# Patient Record
Sex: FEMALE | Race: BLACK OR AFRICAN AMERICAN
[De-identification: names, ages, dates, MRNs, and addresses within clinical notes are randomized per-mention and may not be internally consistent; named-entity substitution may affect disease eponyms.]

---

## 2018-02-08 ENCOUNTER — HOSPITAL ENCOUNTER (EMERGENCY)
Dept: HOSPITAL 72 - EMR | Age: 33
Discharge: HOME | End: 2018-02-08
Payer: MEDICAID

## 2018-02-08 VITALS — BODY MASS INDEX: 31.65 KG/M2 | HEIGHT: 65 IN | WEIGHT: 190 LBS

## 2018-02-08 VITALS — DIASTOLIC BLOOD PRESSURE: 70 MMHG | SYSTOLIC BLOOD PRESSURE: 108 MMHG

## 2018-02-08 VITALS — DIASTOLIC BLOOD PRESSURE: 69 MMHG | SYSTOLIC BLOOD PRESSURE: 106 MMHG

## 2018-02-08 DIAGNOSIS — L03.011: Primary | ICD-10-CM

## 2018-02-08 DIAGNOSIS — Z88.2: ICD-10-CM

## 2018-02-08 PROCEDURE — 10060 I&D ABSCESS SIMPLE/SINGLE: CPT

## 2018-02-08 PROCEDURE — 99283 EMERGENCY DEPT VISIT LOW MDM: CPT

## 2018-02-08 NOTE — EMERGENCY ROOM REPORT
History of Present Illness


General


Chief Complaint:  Upper Extremity Injury


Source:  Patient





Present Illness


HPI


32-year-old female presents to the emergency department complaining of 9/10 in 

severity pain, swelling and tenderness to the right middle finger since 

yesterday.  Patient reports pain initially started on the medial aspect of the 

cuticle and then progressed to the entire cuticle line of the fingernail.  She 

reports having artificial nails placed approximately one week ago.  She denies 

nail-biting. Denies fever pad tenderness.  Pain is exacerbated upon palpation.  

Reports that she is able to bend her finger at the distal portion.  Denies CP, 

Palpitations, LOC, AMS, dizziness, Changes in Vision, Sensation, paresthesias, 

or a sudden severe headache.


Allergies:  


Uncoded Allergies:  


     SULFA (Allergy, Severe, Hives, 2/8/18)





Patient History


Past Medical History:  see triage record


Past Surgical History:  none


Pertinent Family History:  none


Last Menstrual Period:  last month


Pregnant Now:  No


Reviewed Nursing Documentation:  PMH: Agreed, PSxH: Agreed





Nursing Documentation-PMH


Hx Gastrointestinal Problems:  Yes - ulcerative colitis





Review of Systems


All Other Systems:  negative except mentioned in HPI





Physical Exam





Vital Signs








  Date Time  Temp Pulse Resp B/P (MAP) Pulse Ox O2 Delivery O2 Flow Rate FiO2


 


2/8/18 11:36 98.6 97 18 106/69 98 Room Air  








Sp02 EP Interpretation:  reviewed, normal


General Appearance:  no apparent distress, alert, GCS 15, non-toxic


Head:  normocephalic, atraumatic


ENT:  hearing grossly normal, normal voice


Neck:  full range of motion


Respiratory:  lungs clear, normal breath sounds, speaking full sentences


Cardiovascular #1:  regular rate, rhythm, normal capillary refill


Musculoskeletal:  back normal, gait/station normal, normal range of motion, non-

tender


Neurologic:  alert, oriented x3, responsive, motor strength/tone normal, 

sensory intact, speech normal, grossly normal


Psychiatric:  judgement/insight normal


Skin:  no rash, warm/dry, well hydrated, other - erythema, swelling and 

tenderness to the medial aspect of the RMF nail fold, no finger pad tenderness, 

no blisters or vesicles.





Procedures


Additional Procedure


Procedure Narrative


PARONYCHIA DRAINAGE





- verbal consent was received .


- lesion was cleaned with betadine prep. 


- Small incision using a sterile 25g needle to drain the paronychia. pt. 

tolerated well without complication. 


- sterile band-aid was then applied afterward. 


- will d/c pt. with PO abx.





Medical Decision Making


PA Attestation


Dr. Amato is my supervising Physician whom patient management has been 

discussed with.


Diagnostic Impression:  


 Primary Impression:  


 Paronychia of finger of right hand


ER Course


32-year-old female presents to the emergency department complaining of 9/10 in 

severity pain, swelling and tenderness to the right middle finger since 

yesterday.  Patient reports pain initially started on the medial aspect of the 

cuticle and then progressed to the entire cuticle line of the fingernail.  She 

reports having artificial nails placed approximately one week ago.  She denies 

nail-biting. Denies fever pad tenderness.  Pain is exacerbated upon palpation.  

Reports that she is able to bend her finger at the distal portion.  Pt. is 

right hand dominant.  Denies CP, Palpitations, LOC, AMS, dizziness, Changes in 

Vision, Sensation, paresthesias, or a sudden severe headache.





Ddx considered but are not limited to cellulitis, paronychia, eponychia, 

ingrown toe nail, fracture, d/L, gout








Vital signs: are WNL, pt. is afebrile


H&PE are most consistent with Right middle finger paronychia


ORDERS: none required at this time, the diagnosis is clinical





ED INTERVENTIONS: 


- verbal consent was received .


- lesion was cleaned with betadine prep. 


- Small incision using a sterile 25g needle to drain the paronychia. pt. 

tolerated well without complication. 


- sterile band-aid was then applied afterward. 


- will d/c pt. with PO abx.








DISCHARGE: At this time pt. is stable for d/c to home. Will provide printed 

patient care instructions, and any necessary prescriptions. Care plan and 

follow up instructions have been discussed with the patient prior to discharge.





Last Vital Signs








  Date Time  Temp Pulse Resp B/P (MAP) Pulse Ox O2 Delivery O2 Flow Rate FiO2


 


2/8/18 11:36 98.6 97 18 106/69 98 Room Air  








Disposition:  HOME, SELF-CARE


Condition:  Stable


Patient Instructions:  Paronychia





Additional Instructions:  


Take medications as directed. 





 ** Follow up with a Primary Care Provider in 3-5 days, even if your symptoms 

have resolved. ** 


--Please review list of primary care clinics, if you do not already have a 

primary care provider





Return sooner to ED if new symptoms occur, or current symptoms become worse. 








- Please note that this Emergency Department Report was dictated using [employer] technology software, occasionally this can lead to 

erroneous entry secondary to interpretation by the dictation equipment.











Leatha Gutierrez Feb 8, 2018 12:38

## 2018-02-11 ENCOUNTER — HOSPITAL ENCOUNTER (EMERGENCY)
Dept: HOSPITAL 72 - EMR | Age: 33
Discharge: HOME | End: 2018-02-11
Payer: MEDICAID

## 2018-02-11 VITALS — HEIGHT: 65 IN | BODY MASS INDEX: 31.65 KG/M2 | WEIGHT: 190 LBS

## 2018-02-11 VITALS — SYSTOLIC BLOOD PRESSURE: 107 MMHG | DIASTOLIC BLOOD PRESSURE: 70 MMHG

## 2018-02-11 DIAGNOSIS — L03.011: Primary | ICD-10-CM

## 2018-02-11 DIAGNOSIS — Z88.2: ICD-10-CM

## 2018-02-11 PROCEDURE — 99284 EMERGENCY DEPT VISIT MOD MDM: CPT

## 2018-02-11 PROCEDURE — 10060 I&D ABSCESS SIMPLE/SINGLE: CPT

## 2018-02-11 NOTE — EMERGENCY ROOM REPORT
History of Present Illness


General


Chief Complaint:  Pain


Source:  Patient





Present Illness


HPI


The patient is a 32-year-old female presenting for continued pain of her right 

middle finger.  She was seen in this emergency department 3 days prior for the 

same complaint and diagnosed with paronychia.  Incision was done at that time 

and the patient was given prescription for antibiotics which the patient states 

that she filled but has not began taking yet.  She states that pain and 

swelling have now worsened.  Pain is a 9/10 dull ache and does not radiate.  

Worse with touch.  She denies any injury to the area and denies biting her 

nails.  She denies other symptoms including fever, chills, numbness, stiffness


Allergies:  


Uncoded Allergies:  


     SULFA (Allergy, Severe, Hives, 2/8/18)





Patient History


Past Medical History:  see triage record


Pertinent Family History:  none


Reviewed Nursing Documentation:  PMH: Agreed, PSxH: Agreed





Nursing Documentation-PMH


Hx Gastrointestinal Problems:  Yes - ulcerative colitis





Review of Systems


All Other Systems:  negative except mentioned in HPI





Physical Exam





Vital Signs








  Date Time  Temp Pulse Resp B/P (MAP) Pulse Ox O2 Delivery O2 Flow Rate FiO2


 


2/11/18 12:29 98.1 101 20 107/70 99 Room Air  








Sp02 EP Interpretation:  reviewed, normal


General Appearance:  no apparent distress, alert, GCS 15, non-toxic


Head:  normocephalic, atraumatic


Eyes:  bilateral eye normal inspection, bilateral eye PERRL


ENT:  hearing grossly normal, normal pharynx, no angioedema, normal voice


Neck:  full range of motion, supple/symm/no masses


Musculoskeletal:  back normal, gait/station normal, normal range of motion, 

tender - R distal 3rd digit


Neurologic:  alert, oriented x3, responsive, motor strength/tone normal, 

sensory intact, speech normal


Psychiatric:  judgement/insight normal, memory normal, mood/affect normal, no 

suicidal/homicidal ideation


Skin:  other - fluctuance of R 3rd nail fold


Lymphatic:  no adenopathy





Procedures


Incision and Drainage


Incision and Drainage :  


   Consent:  Verbal


   Site:  R 3rd digit


   Blade Size:  11


   I & D Procedure:  betadine prep, sterile drapes applied


   Wound Location:  upper extremity


   Wound's Depth, Shape:  superficial, linear


   Wound Length (cm):  1


   Wound Explored:  contaminated


   Irrigated w/ Saline (ccs):  50


   Anesthesia:  1% Lidocaine


   Volume Anesthetic (ccs):  5


   Splint Applied?:  No


   Sling Applied?:  No


   Patient Tolerated:  Well


   Complications:  None





Medical Decision Making


PA Attestation


Dr. Amato is my supervising physician. Patient management was discussed with 

my supervising physician


Diagnostic Impression:  


 Primary Impression:  


 Paronychia


ER Course


The patient is a 32-year-old female presenting for continued pain of her right 

middle finger.





Differential diagnoses considered but not limited to: paronychia, felon,  

cellulitis, insect bite





PE: afebrile. NAD


TTP and swelling to the R 3rd nail fold. Fluctuant. No fluctuance over pad of 

finger. Full AROM intact. SILT





Betadine prep was used to clean the skin and surrounding area.


One percent lidocaine without epinephrine was used for digital block.


A #11 blade was used to make an incision at the base of the nail. 


Once the incision was made, purulent material was expressed with blood. The 

wound was then cleaned and sterile dressing applied.





The patient was told she needs to complete the course of antibiotics which she 

was prescribed. She will FU with PMD. 


Return precautions given including if she notices fever, chills, numbness, 

stiffness of finger, or continued swelling





Last Vital Signs








  Date Time  Temp Pulse Resp B/P (MAP) Pulse Ox O2 Delivery O2 Flow Rate FiO2


 


2/11/18 13:25 98.1  20 107/70 99 Room Air  


 


2/11/18 12:29  101      








Status:  improved


Disposition:  HOME, SELF-CARE


Condition:  Improved


Scripts


Tramadol Hcl* (ULTRAM*) 50 Mg Tablet


50 MG ORAL Q6H Y for For Pain, #5 TAB 0 Refills


   Prov: HARJIT WILLAMS         2/11/18


Referrals:  


[facility] (PCP)


Patient Instructions:  Paronychia





Additional Instructions:  


I discussed my findings with the patient. All questions and concerns have been 

answered. Treatment and medication compliance have been addressed. I advised 

the patient that they need to follow up with primary doctor within 3 days for 

wound check. Return to ED if symptoms worsen, new symptoms arise such as fever, 

if you are unable to bend your finger, or if needed for any reason. Patient 

verbalized understanding of discharge instructions.





*Make sure to take your antibiotics as prescribed from your previous visit*











HARJIT WILLAMS Feb 11, 2018 13:44

## 2018-04-06 ENCOUNTER — HOSPITAL ENCOUNTER (EMERGENCY)
Dept: HOSPITAL 72 - EMR | Age: 33
Discharge: HOME | End: 2018-04-06
Payer: MEDICAID

## 2018-04-06 VITALS — SYSTOLIC BLOOD PRESSURE: 113 MMHG | DIASTOLIC BLOOD PRESSURE: 61 MMHG

## 2018-04-06 VITALS — HEIGHT: 64 IN | WEIGHT: 198 LBS | BODY MASS INDEX: 33.8 KG/M2

## 2018-04-06 DIAGNOSIS — J02.9: ICD-10-CM

## 2018-04-06 DIAGNOSIS — Z88.2: ICD-10-CM

## 2018-04-06 DIAGNOSIS — J06.9: Primary | ICD-10-CM

## 2018-04-06 PROCEDURE — 71045 X-RAY EXAM CHEST 1 VIEW: CPT

## 2018-04-06 PROCEDURE — 99284 EMERGENCY DEPT VISIT MOD MDM: CPT

## 2018-04-06 PROCEDURE — 81025 URINE PREGNANCY TEST: CPT

## 2018-04-06 NOTE — DIAGNOSTIC IMAGING REPORT
Indication: Cough

 

Technique: One view of the chest

 

Comparison: none

 

Findings: There is some scarring or atelectasis in the right infrahilar region. The

lungs and pleural spaces are otherwise clear. The heart size is normal

 

Impression: Right infrahilar scarring or atelectasis. No acute process otherwise

## 2018-04-06 NOTE — EMERGENCY ROOM REPORT
History of Present Illness


General


Chief Complaint:  Upper Respiratory Illness


Source:  Patient





Present Illness


HPI


32-year-old female p/w cough and throat pain for 2 days. Pt states cough is 

productive, with clear non bloody sputum. Denies fever chills sob.  Complaints 

of chest pain only when she coughs.  Denies runny nose or myalgias. No sick 

contacts or recent travel. Patient does not smoke.  Still able to drink and eat 

normally


Allergies:  


Uncoded Allergies:  


     SULFA (Allergy, Severe, Hives, 18)





Patient History


Past Medical History:  see triage record


Past Surgical History:  none


Pertinent Family History:  none


Last Menstrual Period:  18


Pregnant Now:  No


:  2


Para:  2


Reviewed Nursing Documentation:  PMH: Agreed; PSxH: Agreed





Nursing Documentation-PMH


Past Medical History:  No History, Except For


Hx Gastrointestinal Problems:  Yes - ulcerative colitis





Review of Systems


All Other Systems:  negative except mentioned in HPI





Physical Exam





Vital Signs








  Date Time  Temp Pulse Resp B/P (MAP) Pulse Ox O2 Delivery O2 Flow Rate FiO2


 


18 00:48 98.6 93 16 113/61 97 Room Air  





 98.6       








Sp02 EP Interpretation:  reviewed, normal


General Appearance:  normal inspection, well appearing, no apparent distress, 

alert, GCS 15, non-toxic


Head:  normocephalic, atraumatic


Eyes:  bilateral eye normal inspection, bilateral eye PERRL, bilateral eye EOMI


ENT:  pharyngeal erythema, other - no exudates


Neck:  normal inspection, full range of motion, supple


Respiratory:  normal inspection, lungs clear, normal breath sounds, no 

respiratory distress, no retraction, no wheezing, speaking full sentences, 

chest symmetrical


Cardiovascular #1:  normal inspection, regular rate, rhythm, no edema, normal 

capillary refill


Cardiovascular #2:  2+ radial (R), 2+ radial (L)


Gastrointestinal:  normal inspection, non tender, soft, non-distended, no 

guarding


Musculoskeletal:  normal inspection, back normal, normal range of motion, non-

tender


Neurologic:  normal inspection, alert, oriented x3, responsive, motor strength/

tone normal, sensory intact, normal gait, speech normal


Psychiatric:  normal inspection, judgement/insight normal, memory normal


Skin:  normal inspection, normal color, no rash, warm/dry, well hydrated, 

normal turgor





Medical Decision Making


Diagnostic Impression:  


 Primary Impression:  


 Upper respiratory infection


 Additional Impression:  


 Pharyngitis


ER Course


32-year-old female p/w cough and throat pain for 2 days. 





DDX: 


Viral URI vs. pneumonia versus pharyngitis





Plan: 


Chest x-ray


Motrin





ER course: 


Patient remains nontoxic, not in resp distress.


CXR obtained - no acute infiltrate





Disposition:


Patient is to be discharged home with a prescription of Tessalon Perles and 

Motrin


Strict precautions discussed with patient on when to return to the emergency 

room including hemoptysis, high fevers, chills, SOB, chest pain which may 

indicate severe illness.


Patient is to follow up with their primary care doctor within 5 days. Patient 

agrees with plan.


Please note that this Emergency Department Report was dictated using [employer] technology software, occasionally this can lead to 

erroneous entry secondary to interpretation by the dictation equipment





Chest X-ray


CXR: Ordered: Yes


1 view


Indication: Cough


EP interpretation: Yes


Interpretation: No consolidation, no effusion, no PTX, no acute cardiopulmonary 

disease


Impression: No acute disease


Electronically signed by Mihir Mendez MD





Last Vital Signs








  Date Time  Temp Pulse Resp B/P (MAP) Pulse Ox O2 Delivery O2 Flow Rate FiO2


 


18 00:48 98.6 93 16 113/61 97 Room Air  





 98.6       








Disposition:  HOME, SELF-CARE


Condition:  Improved


Scripts


Benzonatate* (TESSALON PERLE*) 100 Mg Capsule


100 MG ORAL THREE TIMES A DAY, #21 PERLE


   Prov: Mihir Mendez M.D.         18 


Ibuprofen* (MOTRIN*) 600 Mg Tablet


600 MG ORAL Q8H PRN for For Pain, #30 TAB 0 Refills


   Prov: Mihir Mendez M.D.         18











Mihir Mendez M.D. 2018 01:07

## 2018-04-17 ENCOUNTER — HOSPITAL ENCOUNTER (INPATIENT)
Dept: HOSPITAL 87 - ER | Age: 33
LOS: 4 days | Discharge: HOME | DRG: 245 | End: 2018-04-21
Attending: INTERNAL MEDICINE | Admitting: INTERNAL MEDICINE
Payer: MEDICAID

## 2018-04-17 VITALS — HEIGHT: 65 IN | WEIGHT: 190 LBS | BODY MASS INDEX: 31.65 KG/M2

## 2018-04-17 DIAGNOSIS — I10: ICD-10-CM

## 2018-04-17 DIAGNOSIS — K51.90: Primary | ICD-10-CM

## 2018-04-17 DIAGNOSIS — D50.9: ICD-10-CM

## 2018-04-17 DIAGNOSIS — Z82.49: ICD-10-CM

## 2018-04-17 DIAGNOSIS — E86.0: ICD-10-CM

## 2018-04-17 LAB
APPEARANCE UR: CLEAR
BASOPHILS NFR BLD AUTO: 0.6 % (ref 0–2)
CHLORIDE SERPL-SCNC: 106 MEQ/L (ref 98–107)
COLOR UR: YELLOW
EOSINOPHIL NFR BLD AUTO: 0.9 % (ref 0–5)
ERYTHROCYTE [DISTWIDTH] IN BLOOD BY AUTOMATED COUNT: 24 % (ref 11.6–14.6)
HCT VFR BLD AUTO: 28.8 % (ref 36–48)
HGB BLD-MCNC: 8.5 G/DL (ref 12–16)
HGB UR QL STRIP: (no result)
INR PPP: 1
KETONES UR STRIP-MCNC: NEGATIVE MG/DL
LEUKOCYTE ESTERASE UR QL STRIP: NEGATIVE
LYMPHOCYTES NFR BLD AUTO: 25.8 % (ref 20–50)
MCH RBC QN AUTO: 17.4 PG (ref 28–32)
MCV RBC AUTO: 59 FL (ref 81–99)
MONOCYTES NFR BLD AUTO: 6 % (ref 2–8)
NEUTROPHILS NFR BLD AUTO: 66.7 % (ref 40–76)
NITRITE UR QL STRIP: NEGATIVE
PH UR STRIP: 5 [PH] (ref 4.5–8)
PLATELET # BLD AUTO: 519 X1000/UL (ref 130–400)
PLATELET # BLD EST: (no result) 10*3/UL
PMV BLD AUTO: 8.5 FL (ref 7.4–10.4)
PROT UR QL STRIP: (no result)
PROTHROMBIN TIME: 10 SEC (ref 9.4–11.6)
RBC # BLD AUTO: 4.88 MILL/UL (ref 4.2–5.4)
SP GR UR STRIP: 1.02 (ref 1–1.03)
UROBILINOGEN UR STRIP-MCNC: 0.2 E.U./DL (ref 0.2–1)

## 2018-04-17 PROCEDURE — 81003 URINALYSIS AUTO W/O SCOPE: CPT

## 2018-04-17 PROCEDURE — 85014 HEMATOCRIT: CPT

## 2018-04-17 PROCEDURE — 81025 URINE PREGNANCY TEST: CPT

## 2018-04-17 PROCEDURE — 87040 BLOOD CULTURE FOR BACTERIA: CPT

## 2018-04-17 PROCEDURE — 96361 HYDRATE IV INFUSION ADD-ON: CPT

## 2018-04-17 PROCEDURE — 80061 LIPID PANEL: CPT

## 2018-04-17 PROCEDURE — 85610 PROTHROMBIN TIME: CPT

## 2018-04-17 PROCEDURE — 85025 COMPLETE CBC W/AUTO DIFF WBC: CPT

## 2018-04-17 PROCEDURE — 87086 URINE CULTURE/COLONY COUNT: CPT

## 2018-04-17 PROCEDURE — 85018 HEMOGLOBIN: CPT

## 2018-04-17 PROCEDURE — 96365 THER/PROPH/DIAG IV INF INIT: CPT

## 2018-04-17 PROCEDURE — 80048 BASIC METABOLIC PNL TOTAL CA: CPT

## 2018-04-17 PROCEDURE — 36415 COLL VENOUS BLD VENIPUNCTURE: CPT

## 2018-04-17 PROCEDURE — 99285 EMERGENCY DEPT VISIT HI MDM: CPT

## 2018-04-17 PROCEDURE — 96366 THER/PROPH/DIAG IV INF ADDON: CPT

## 2018-04-17 PROCEDURE — 96375 TX/PRO/DX INJ NEW DRUG ADDON: CPT

## 2018-04-17 PROCEDURE — 96367 TX/PROPH/DG ADDL SEQ IV INF: CPT

## 2018-04-17 PROCEDURE — 74176 CT ABD & PELVIS W/O CONTRAST: CPT

## 2018-04-17 PROCEDURE — 83690 ASSAY OF LIPASE: CPT

## 2018-04-17 PROCEDURE — 80053 COMPREHEN METABOLIC PANEL: CPT

## 2018-04-18 VITALS — DIASTOLIC BLOOD PRESSURE: 62 MMHG | SYSTOLIC BLOOD PRESSURE: 109 MMHG

## 2018-04-18 VITALS — SYSTOLIC BLOOD PRESSURE: 112 MMHG | DIASTOLIC BLOOD PRESSURE: 66 MMHG

## 2018-04-18 VITALS — DIASTOLIC BLOOD PRESSURE: 56 MMHG | SYSTOLIC BLOOD PRESSURE: 109 MMHG

## 2018-04-18 VITALS — SYSTOLIC BLOOD PRESSURE: 101 MMHG | DIASTOLIC BLOOD PRESSURE: 48 MMHG

## 2018-04-18 LAB — CHLORIDE SERPL-SCNC: 108 MEQ/L (ref 98–107)

## 2018-04-18 RX ADMIN — DEXTROSE MONOHYDRATE SCH MLS/HR: 50 INJECTION, SOLUTION INTRAVENOUS at 01:30

## 2018-04-18 RX ADMIN — METHYLPREDNISOLONE SODIUM SUCCINATE SCH MG: 125 INJECTION, POWDER, FOR SOLUTION INTRAMUSCULAR; INTRAVENOUS at 10:11

## 2018-04-18 RX ADMIN — METHYLPREDNISOLONE SODIUM SUCCINATE SCH MG: 125 INJECTION, POWDER, FOR SOLUTION INTRAMUSCULAR; INTRAVENOUS at 16:24

## 2018-04-18 RX ADMIN — ENOXAPARIN SODIUM SCH MG: 100 INJECTION SUBCUTANEOUS at 10:11

## 2018-04-18 RX ADMIN — METRONIDAZOLE SCH MLS/HR: 500 INJECTION, SOLUTION INTRAVENOUS at 10:11

## 2018-04-18 RX ADMIN — DEXTROSE MONOHYDRATE SCH MLS/HR: 50 INJECTION, SOLUTION INTRAVENOUS at 16:23

## 2018-04-18 RX ADMIN — MESALAMINE SCH MG: 400 CAPSULE, DELAYED RELEASE ORAL at 17:13

## 2018-04-18 RX ADMIN — METRONIDAZOLE SCH MLS/HR: 500 INJECTION, SOLUTION INTRAVENOUS at 17:14

## 2018-04-18 RX ADMIN — METHYLPREDNISOLONE SODIUM SUCCINATE SCH MG: 125 INJECTION, POWDER, FOR SOLUTION INTRAMUSCULAR; INTRAVENOUS at 21:03

## 2018-04-18 RX ADMIN — HYDROCODONE BITARTRATE AND ACETAMINOPHEN PRN TAB: 10; 325 TABLET ORAL at 17:14

## 2018-04-18 RX ADMIN — METHYLPREDNISOLONE SODIUM SUCCINATE SCH MG: 125 INJECTION, POWDER, FOR SOLUTION INTRAMUSCULAR; INTRAVENOUS at 04:50

## 2018-04-19 VITALS — SYSTOLIC BLOOD PRESSURE: 112 MMHG | DIASTOLIC BLOOD PRESSURE: 53 MMHG

## 2018-04-19 VITALS — SYSTOLIC BLOOD PRESSURE: 100 MMHG | DIASTOLIC BLOOD PRESSURE: 51 MMHG

## 2018-04-19 VITALS — SYSTOLIC BLOOD PRESSURE: 112 MMHG | DIASTOLIC BLOOD PRESSURE: 52 MMHG

## 2018-04-19 VITALS — SYSTOLIC BLOOD PRESSURE: 123 MMHG | DIASTOLIC BLOOD PRESSURE: 63 MMHG

## 2018-04-19 VITALS — DIASTOLIC BLOOD PRESSURE: 60 MMHG | SYSTOLIC BLOOD PRESSURE: 102 MMHG

## 2018-04-19 LAB
CHLORIDE SERPL-SCNC: 110 MEQ/L (ref 98–107)
ERYTHROCYTE [DISTWIDTH] IN BLOOD BY AUTOMATED COUNT: 24 % (ref 11.6–14.6)
HCT VFR BLD AUTO: 23.8 % (ref 36–48)
HCT VFR BLD AUTO: 25.1 % (ref 36–48)
HDLC SERPL-MCNC: 56 MG/DL (ref 40–59)
HGB BLD-MCNC: 6.9 G/DL (ref 12–16)
HGB BLD-MCNC: 7.3 G/DL (ref 12–16)
LDLC SERPL DIRECT ASSAY-MCNC: 90 MG/DL (ref 5–100)
LYMPHOCYTES NFR BLD MANUAL: 6 % (ref 20–60)
MCH RBC QN AUTO: 17.4 PG (ref 28–32)
MCV RBC AUTO: 60 FL (ref 81–99)
MONOCYTES NFR BLD MANUAL: 2 % (ref 2–8)
NEUTS BAND NFR BLD MANUAL: 1 % (ref 1–6)
NEUTS SEG NFR BLD MANUAL: 91 % (ref 45–75)
PLATELET # BLD AUTO: 455 X1000/UL (ref 130–400)
PLATELET # BLD EST: (no result) 10*3/UL
PMV BLD AUTO: 8.7 FL (ref 7.4–10.4)
RBC # BLD AUTO: 3.96 MILL/UL (ref 4.2–5.4)

## 2018-04-19 RX ADMIN — MESALAMINE SCH MG: 400 CAPSULE, DELAYED RELEASE ORAL at 12:22

## 2018-04-19 RX ADMIN — DIPHENHYDRAMINE HYDROCHLORIDE PRN MG: 50 INJECTION, SOLUTION INTRAMUSCULAR; INTRAVENOUS at 22:14

## 2018-04-19 RX ADMIN — MESALAMINE SCH MG: 400 CAPSULE, DELAYED RELEASE ORAL at 08:22

## 2018-04-19 RX ADMIN — ENOXAPARIN SODIUM SCH MG: 100 INJECTION SUBCUTANEOUS at 08:22

## 2018-04-19 RX ADMIN — METHYLPREDNISOLONE SODIUM SUCCINATE SCH MG: 125 INJECTION, POWDER, FOR SOLUTION INTRAMUSCULAR; INTRAVENOUS at 22:01

## 2018-04-19 RX ADMIN — HYDROCODONE BITARTRATE AND ACETAMINOPHEN PRN TAB: 10; 325 TABLET ORAL at 15:23

## 2018-04-19 RX ADMIN — LEVOFLOXACIN SCH MLS/HR: 5 INJECTION, SOLUTION INTRAVENOUS at 05:51

## 2018-04-19 RX ADMIN — METRONIDAZOLE SCH MLS/HR: 500 INJECTION, SOLUTION INTRAVENOUS at 09:16

## 2018-04-19 RX ADMIN — METRONIDAZOLE SCH MLS/HR: 500 INJECTION, SOLUTION INTRAVENOUS at 01:33

## 2018-04-19 RX ADMIN — DEXTROSE MONOHYDRATE SCH MLS/HR: 50 INJECTION, SOLUTION INTRAVENOUS at 05:54

## 2018-04-19 RX ADMIN — METRONIDAZOLE SCH MLS/HR: 500 INJECTION, SOLUTION INTRAVENOUS at 20:00

## 2018-04-19 RX ADMIN — MESALAMINE SCH MG: 400 CAPSULE, DELAYED RELEASE ORAL at 17:01

## 2018-04-19 RX ADMIN — METHYLPREDNISOLONE SODIUM SUCCINATE SCH MG: 125 INJECTION, POWDER, FOR SOLUTION INTRAMUSCULAR; INTRAVENOUS at 15:23

## 2018-04-19 RX ADMIN — DEXTROSE MONOHYDRATE SCH MLS/HR: 50 INJECTION, SOLUTION INTRAVENOUS at 17:03

## 2018-04-19 RX ADMIN — METHYLPREDNISOLONE SODIUM SUCCINATE SCH MG: 125 INJECTION, POWDER, FOR SOLUTION INTRAMUSCULAR; INTRAVENOUS at 09:15

## 2018-04-19 RX ADMIN — METHYLPREDNISOLONE SODIUM SUCCINATE SCH MG: 125 INJECTION, POWDER, FOR SOLUTION INTRAMUSCULAR; INTRAVENOUS at 05:50

## 2018-04-20 LAB
CHLORIDE SERPL-SCNC: 108 MEQ/L (ref 98–107)
ERYTHROCYTE [DISTWIDTH] IN BLOOD BY AUTOMATED COUNT: 24.2 % (ref 11.6–14.6)
HCT VFR BLD AUTO: 25.3 % (ref 36–48)
HGB BLD-MCNC: 7.1 G/DL (ref 12–16)
MCH RBC QN AUTO: 17.1 PG (ref 28–32)
MCV RBC AUTO: 60.6 FL (ref 81–99)
PLATELET # BLD AUTO: 488 X1000/UL (ref 130–400)
PMV BLD AUTO: 9 FL (ref 7.4–10.4)
RBC # BLD AUTO: 4.18 MILL/UL (ref 4.2–5.4)

## 2018-04-20 RX ADMIN — DIPHENHYDRAMINE HYDROCHLORIDE PRN MG: 50 INJECTION, SOLUTION INTRAMUSCULAR; INTRAVENOUS at 04:16

## 2018-04-20 RX ADMIN — METRONIDAZOLE SCH MLS/HR: 500 INJECTION, SOLUTION INTRAVENOUS at 11:49

## 2018-04-20 RX ADMIN — METHYLPREDNISOLONE SODIUM SUCCINATE SCH MG: 125 INJECTION, POWDER, FOR SOLUTION INTRAMUSCULAR; INTRAVENOUS at 09:28

## 2018-04-20 RX ADMIN — MESALAMINE SCH MG: 400 CAPSULE, DELAYED RELEASE ORAL at 09:28

## 2018-04-20 RX ADMIN — METRONIDAZOLE SCH MLS/HR: 500 INJECTION, SOLUTION INTRAVENOUS at 04:02

## 2018-04-20 RX ADMIN — MESALAMINE SCH MG: 400 CAPSULE, DELAYED RELEASE ORAL at 12:48

## 2018-04-20 RX ADMIN — LEVOFLOXACIN SCH MLS/HR: 5 INJECTION, SOLUTION INTRAVENOUS at 04:22

## 2018-04-20 RX ADMIN — METRONIDAZOLE SCH MLS/HR: 500 INJECTION, SOLUTION INTRAVENOUS at 20:03

## 2018-04-20 RX ADMIN — DEXTROSE MONOHYDRATE SCH MLS/HR: 50 INJECTION, SOLUTION INTRAVENOUS at 21:39

## 2018-04-20 RX ADMIN — DIPHENHYDRAMINE HYDROCHLORIDE PRN MG: 50 INJECTION, SOLUTION INTRAMUSCULAR; INTRAVENOUS at 21:48

## 2018-04-20 RX ADMIN — METHYLPREDNISOLONE SODIUM SUCCINATE SCH MG: 125 INJECTION, POWDER, FOR SOLUTION INTRAMUSCULAR; INTRAVENOUS at 16:21

## 2018-04-20 RX ADMIN — MESALAMINE SCH MG: 400 CAPSULE, DELAYED RELEASE ORAL at 16:21

## 2018-04-20 RX ADMIN — METHYLPREDNISOLONE SODIUM SUCCINATE SCH MG: 125 INJECTION, POWDER, FOR SOLUTION INTRAMUSCULAR; INTRAVENOUS at 04:09

## 2018-04-20 RX ADMIN — DEXTROSE MONOHYDRATE SCH MLS/HR: 50 INJECTION, SOLUTION INTRAVENOUS at 20:05

## 2018-04-20 RX ADMIN — ENOXAPARIN SODIUM SCH MG: 100 INJECTION SUBCUTANEOUS at 09:29

## 2018-04-20 RX ADMIN — METHYLPREDNISOLONE SODIUM SUCCINATE SCH MG: 125 INJECTION, POWDER, FOR SOLUTION INTRAMUSCULAR; INTRAVENOUS at 21:38

## 2018-04-21 VITALS — DIASTOLIC BLOOD PRESSURE: 70 MMHG | SYSTOLIC BLOOD PRESSURE: 122 MMHG

## 2018-04-21 VITALS — DIASTOLIC BLOOD PRESSURE: 68 MMHG | SYSTOLIC BLOOD PRESSURE: 124 MMHG

## 2018-04-21 VITALS — DIASTOLIC BLOOD PRESSURE: 79 MMHG | SYSTOLIC BLOOD PRESSURE: 129 MMHG

## 2018-04-21 VITALS — SYSTOLIC BLOOD PRESSURE: 122 MMHG | DIASTOLIC BLOOD PRESSURE: 70 MMHG

## 2018-04-21 VITALS — DIASTOLIC BLOOD PRESSURE: 60 MMHG | SYSTOLIC BLOOD PRESSURE: 117 MMHG

## 2018-04-21 LAB
LYMPHOCYTES NFR BLD MANUAL: 4 % (ref 20–60)
MONOCYTES NFR BLD MANUAL: 2 % (ref 2–8)
NEUTS SEG NFR BLD MANUAL: 94 % (ref 45–75)
PLATELET # BLD EST: (no result) 10*3/UL

## 2018-04-21 RX ADMIN — DIPHENHYDRAMINE HYDROCHLORIDE PRN MG: 50 INJECTION, SOLUTION INTRAMUSCULAR; INTRAVENOUS at 05:11

## 2018-04-21 RX ADMIN — ENOXAPARIN SODIUM SCH MG: 100 INJECTION SUBCUTANEOUS at 09:12

## 2018-04-21 RX ADMIN — MESALAMINE SCH MG: 400 CAPSULE, DELAYED RELEASE ORAL at 09:11

## 2018-04-21 RX ADMIN — METHYLPREDNISOLONE SODIUM SUCCINATE SCH MG: 125 INJECTION, POWDER, FOR SOLUTION INTRAMUSCULAR; INTRAVENOUS at 09:11

## 2018-04-21 RX ADMIN — METHYLPREDNISOLONE SODIUM SUCCINATE SCH MG: 125 INJECTION, POWDER, FOR SOLUTION INTRAMUSCULAR; INTRAVENOUS at 04:52

## 2018-04-21 RX ADMIN — LEVOFLOXACIN SCH MLS/HR: 5 INJECTION, SOLUTION INTRAVENOUS at 04:55

## 2018-04-21 RX ADMIN — METRONIDAZOLE SCH MLS/HR: 500 INJECTION, SOLUTION INTRAVENOUS at 03:05

## 2020-02-25 ENCOUNTER — HOSPITAL ENCOUNTER (EMERGENCY)
Dept: HOSPITAL 87 - ER | Age: 35
Discharge: HOME | End: 2020-02-25
Payer: MEDICAID

## 2020-02-25 VITALS — BODY MASS INDEX: 27.55 KG/M2 | WEIGHT: 165.35 LBS | HEIGHT: 65 IN

## 2020-02-25 VITALS — SYSTOLIC BLOOD PRESSURE: 106 MMHG | DIASTOLIC BLOOD PRESSURE: 67 MMHG

## 2020-02-25 DIAGNOSIS — Z3A.09: ICD-10-CM

## 2020-02-25 DIAGNOSIS — O46.91: Primary | ICD-10-CM

## 2020-02-25 DIAGNOSIS — Z88.2: ICD-10-CM

## 2020-02-25 DIAGNOSIS — I10: ICD-10-CM

## 2020-02-25 DIAGNOSIS — Z87.19: ICD-10-CM

## 2020-02-25 DIAGNOSIS — R42: ICD-10-CM

## 2020-02-25 DIAGNOSIS — O26.891: ICD-10-CM

## 2020-02-25 LAB
APPEARANCE UR: CLEAR
BASOPHILS NFR BLD AUTO: 0.5 % (ref 0–2)
CHLORIDE SERPL-SCNC: 104 MEQ/L (ref 98–107)
COLOR UR: YELLOW
EOSINOPHIL NFR BLD AUTO: 0.4 % (ref 0–5)
ERYTHROCYTE [DISTWIDTH] IN BLOOD BY AUTOMATED COUNT: 14.5 % (ref 11.6–14.6)
HCG SERPL QL: POSITIVE
HCT VFR BLD AUTO: 31.8 % (ref 36–48)
HGB BLD-MCNC: 10.2 G/DL (ref 12–16)
HGB UR QL STRIP: (no result)
INR PPP: 1
KETONES UR STRIP-MCNC: NEGATIVE MG/DL
LEUKOCYTE ESTERASE UR QL STRIP: NEGATIVE
LYMPHOCYTES NFR BLD AUTO: 21.7 % (ref 20–50)
MCH RBC QN AUTO: 24 PG (ref 28–32)
MCV RBC AUTO: 75.3 FL (ref 81–99)
MONOCYTES NFR BLD AUTO: 10.8 % (ref 2–8)
NEUTROPHILS NFR BLD AUTO: 66.6 % (ref 40–76)
NITRITE UR QL STRIP: NEGATIVE
PH UR STRIP: 5.5 [PH] (ref 4.5–8)
PLATELET # BLD AUTO: 328 X1000/UL (ref 130–400)
PMV BLD AUTO: 7.6 FL (ref 7.4–10.4)
PROT UR QL STRIP: (no result)
PROTHROMBIN TIME: 10.6 SEC (ref 9.6–11)
RBC # BLD AUTO: 4.22 MILL/UL (ref 4.2–5.4)
SP GR UR STRIP: 1.02 (ref 1–1.03)
UROBILINOGEN UR STRIP-MCNC: 0.2 E.U./DL (ref 0.2–1)

## 2020-02-25 PROCEDURE — 99285 EMERGENCY DEPT VISIT HI MDM: CPT

## 2020-02-25 PROCEDURE — 80053 COMPREHEN METABOLIC PANEL: CPT

## 2020-02-25 PROCEDURE — 76801 OB US < 14 WKS SINGLE FETUS: CPT

## 2020-02-25 PROCEDURE — 36415 COLL VENOUS BLD VENIPUNCTURE: CPT

## 2020-02-25 PROCEDURE — 85025 COMPLETE CBC W/AUTO DIFF WBC: CPT

## 2020-02-25 PROCEDURE — 96374 THER/PROPH/DIAG INJ IV PUSH: CPT

## 2020-02-25 PROCEDURE — 81025 URINE PREGNANCY TEST: CPT

## 2020-02-25 PROCEDURE — 76817 TRANSVAGINAL US OBSTETRIC: CPT

## 2020-02-25 PROCEDURE — 96375 TX/PRO/DX INJ NEW DRUG ADDON: CPT

## 2020-02-25 PROCEDURE — 84702 CHORIONIC GONADOTROPIN TEST: CPT

## 2020-02-25 PROCEDURE — 84703 CHORIONIC GONADOTROPIN ASSAY: CPT

## 2020-02-25 PROCEDURE — 81003 URINALYSIS AUTO W/O SCOPE: CPT

## 2020-02-25 PROCEDURE — 83690 ASSAY OF LIPASE: CPT

## 2020-02-25 PROCEDURE — 85610 PROTHROMBIN TIME: CPT
